# Patient Record
Sex: FEMALE | Race: WHITE | NOT HISPANIC OR LATINO | URBAN - NONMETROPOLITAN AREA
[De-identification: names, ages, dates, MRNs, and addresses within clinical notes are randomized per-mention and may not be internally consistent; named-entity substitution may affect disease eponyms.]

---

## 2018-11-29 PROBLEM — E11.9: Noted: 2019-01-30

## 2018-11-29 PROBLEM — H35.363: Noted: 2018-11-29

## 2018-11-29 PROBLEM — H25.813: Noted: 2019-01-30

## 2018-11-29 PROBLEM — H02.413: Noted: 2019-01-30

## 2019-01-30 ENCOUNTER — IMPORTED ENCOUNTER (OUTPATIENT)
Dept: URBAN - NONMETROPOLITAN AREA CLINIC 1 | Facility: CLINIC | Age: 84
End: 2019-01-30

## 2019-01-30 PROCEDURE — 92014 COMPRE OPH EXAM EST PT 1/>: CPT

## 2019-01-30 NOTE — PATIENT DISCUSSION
Cataract(s)-Visually significant cataract OU .-Cataract(s) causing symptomatic impairment of visual function not correctable with a tolerable change in glasses or contact lenses lighting or non-operative means resulting in specific activity limitations and/or participation restrictions including but not limited to reading viewing television driving or meeting vocational or recreational needs. -Expectation is clearer vision and functional improvement in symptoms as well as reduced glare disability after cataract removal.-Order IOLMaster and OPD today. -Recommend  Limbal Relaxing Incisions based on today's OPD testing and lifestyle questionnaire.-All questions were answered regarding surgery including pre and post-op medications appointments activity restrictions and anesthetic usage.-The risks benefits and alternatives and special risk factors for the patient were discussed in detail including but not limited to: bleeding infection retinal detachment vitreous loss problems with the implant and possible need for additional surgery.-Although rare the possibility of complete vision loss was discussed.-The possible need for glasses post-operatively was discussed.-Order H&P based on history of -Patient elects to proceed with cataract surgery OD . Will schedule at patient's convenience and re-evaluate OS  in the future. **Recommend patient start AT's/Durezol TID OU x 1 week then return for 1 week for repeat testing. DM s DR-Stressed the importance of keeping blood sugars under control blood pressure under control and weight normalization and regular visits with PCP. -Explained the possible effects of poorly controlled diabetes and the damage that diabetes can cause to ocular health. -Patient to check HgbA1C.-Pt instructed to contact our office with any vision changes. Ptosis/Bleph: *After CE OU* - MRD 1: 1mm OU; BLF 14 (-) lag TBUT 12 seconds -Ptosis (the upper eyelid being in a lower than normal position) of the upper eyelid was explained to the patient. -RBAs of ptosis repair discussed with patient.  Treatment options include observation or surgical correction.-Due to affect of dermatochalasis on aesthetic outcome recommend pt have blepharoplasty during ptosis repair.-Will order ptosis VF and external photos and review results with patient.; Dr's Notes: MR  5/23/18DFE  11/29/18Optos  5/14/15OCT mac  11/29/18

## 2019-02-25 ENCOUNTER — IMPORTED ENCOUNTER (OUTPATIENT)
Dept: URBAN - NONMETROPOLITAN AREA CLINIC 1 | Facility: CLINIC | Age: 84
End: 2019-02-25

## 2019-02-25 PROBLEM — E78.5: Noted: 2019-02-25

## 2019-02-25 PROBLEM — M51.36: Noted: 2019-02-25

## 2019-02-25 PROBLEM — Z85.3: Noted: 2019-02-25

## 2019-02-25 PROBLEM — M19.90: Noted: 2019-02-25

## 2019-02-25 PROBLEM — E11.9: Noted: 2019-01-30

## 2019-02-25 PROBLEM — I10: Noted: 2019-02-25

## 2019-02-25 PROBLEM — Z01.818: Noted: 2019-02-25

## 2019-02-25 PROCEDURE — 99214 OFFICE O/P EST MOD 30 MIN: CPT

## 2019-02-25 NOTE — PATIENT DISCUSSION
Per H&P patient has been cleared for cataract surgery; 's Notes: MR  5/23/18DFE  11/29/18Optos  5/14/15OCT mac  11/29/18

## 2019-03-05 ENCOUNTER — IMPORTED ENCOUNTER (OUTPATIENT)
Dept: URBAN - NONMETROPOLITAN AREA CLINIC 1 | Facility: CLINIC | Age: 84
End: 2019-03-05

## 2019-03-05 PROCEDURE — 92136 OPHTHALMIC BIOMETRY: CPT

## 2019-03-05 PROCEDURE — 66999 UNLISTED PX ANT SEGMENT EYE: CPT

## 2019-03-05 PROCEDURE — 66984 XCAPSL CTRC RMVL W/O ECP: CPT

## 2019-03-05 PROCEDURE — 99024 POSTOP FOLLOW-UP VISIT: CPT

## 2019-03-05 NOTE — PATIENT DISCUSSION
Cataract(s)-Visually significant cataract OS . -Cataract(s) causing symptomatic impairment of visual function not correctable with a tolerable change in glasses or contact lenses lighting or non-operative means resulting in specific activity limitations and/or participation restrictions including but not limited to reading viewing television driving or meeting vocational or recreational needs. -Expectation is clearer vision and functional improvement in symptoms as well as reduced glare disability after cataract removal.-Recommend Standard/Traditonal based on previous OPD testing and lifestyle questionnaire.-All questions were answered regarding surgery including pre and post-op medications appointments activity restrictions and anesthetic usage.-The risks benefits and alternatives and special risk factors for the patient were discussed in detail including but not limited to: bleeding infection retinal detachment vitreous loss problems with the implant and possible need for additional surgery.-Although rare the possibility of complete vision loss was discussed.-The need for glasses post-operatively was discussed.-Patient elects to proceed with cataract surgery OS . Will schedule at patient's convenience. s/p PCIOL Same Day POV OD Standard 3/5/19-Pt doing well at 1 week s/p PCIOL. -Continue post-op gtts according to instruction sheet.-Okay to resume usual activites and d/c eye shield. s/p PCIOL-Pt doing well s/p PCIOL. -Continue post-op gtts according to instruction sheet and sleep with eye shield over eye for 7 nights.-Avoid bending at the waist lifting anything over 5lbs and dirty or hedy environments. -RTC .; 's Notes: MR  5/23/18DFE  11/29/18Optos  5/14/15OCT mac  11/29/18

## 2019-03-07 PROBLEM — Z98.41: Noted: 2019-03-07

## 2019-03-07 PROBLEM — H25.812: Noted: 2019-03-07

## 2019-03-12 ENCOUNTER — IMPORTED ENCOUNTER (OUTPATIENT)
Dept: URBAN - NONMETROPOLITAN AREA CLINIC 1 | Facility: CLINIC | Age: 84
End: 2019-03-12

## 2019-03-12 PROBLEM — Z98.41: Noted: 2019-03-12

## 2019-03-12 PROBLEM — H25.812: Noted: 2019-03-12

## 2019-03-12 PROCEDURE — 99024 POSTOP FOLLOW-UP VISIT: CPT

## 2019-03-12 NOTE — PATIENT DISCUSSION
1 week POV CE OD 3/5/19 Standard- Discussed diagnosis in detail with patient - Patient is stable and doing well - Continue all post op drops as directed - Continue to monitor - RTC A/S with Dr. Mirela Goodman Cataract(s)-Visually significant cataract OS . -Cataract(s) causing symptomatic impairment of visual function not correctable with a tolerable change in glasses or contact lenses lighting or non-operative means resulting in specific activity limitations and/or participation restrictions including but not limited to reading viewing television driving or meeting vocational or recreational needs. -Expectation is clearer vision and functional improvement in symptoms as well as reduced glare disability after cataract removal.-Recommend Standard/Traditonal based on previous OPD testing and lifestyle questionnaire.-All questions were answered regarding surgery including pre and post-op medications appointments activity restrictions and anesthetic usage.-The risks benefits and alternatives and special risk factors for the patient were discussed in detail including but not limited to: bleeding infection retinal detachment vitreous loss problems with the implant and possible need for additional surgery.-Although rare the possibility of complete vision loss was discussed.-The need for glasses post-operatively was discussed.-Patient elects to proceed with cataract surgery OS .  Will schedule at patient's convenience.; 's Notes: MR  5/23/18DFE  11/29/18Optos  5/14/15OCT mac  11/29/18

## 2019-03-20 ENCOUNTER — IMPORTED ENCOUNTER (OUTPATIENT)
Dept: URBAN - NONMETROPOLITAN AREA CLINIC 1 | Facility: CLINIC | Age: 84
End: 2019-03-20

## 2019-03-20 PROBLEM — Z98.42: Noted: 2019-03-20

## 2019-03-20 PROBLEM — Z98.41: Noted: 2019-03-20

## 2019-03-20 PROCEDURE — 99024 POSTOP FOLLOW-UP VISIT: CPT

## 2019-03-20 NOTE — PATIENT DISCUSSION
1 day CE OS 3/19/19 CE OD 3/5/19 (Standard IOL w/ LRI OU)-  The pt has undergone successful cataract extraction with Intraocular lens implantation in both eyes now. -  PO examination is normal and visual acuity has improved. -  The pt has been instructed to call the office immediately if there is increased redness pain or vision loss. -  Instructed patient not to rub the eye and don’t go swimming. -  Pt should return in 1 week for follow up.  -  Ocular meds plan discussed and patient received printed take home instructions.; 's Notes: MR  5/23/18DFE  11/29/18Optos  5/14/15OCT mac  11/29/18

## 2019-03-26 ENCOUNTER — IMPORTED ENCOUNTER (OUTPATIENT)
Dept: URBAN - NONMETROPOLITAN AREA CLINIC 1 | Facility: CLINIC | Age: 84
End: 2019-03-26

## 2019-03-26 PROBLEM — Z98.42: Noted: 2019-03-20

## 2019-03-26 PROBLEM — Z98.41: Noted: 2019-03-20

## 2019-03-26 PROBLEM — H26.493: Noted: 2019-03-26

## 2019-03-26 PROCEDURE — 99024 POSTOP FOLLOW-UP VISIT: CPT

## 2019-03-26 NOTE — PATIENT DISCUSSION
1 Week POV CE OS 3/19/19 CE OD 3/5/19 Standard w/ LRI OU - Discussed diagnosis in detail with patient- Patient is stable and doing well- Wound intact- Continue all post op drops as directed- PCO OU noted but stable and no treatment needed at this time- Patient does not wish to have a glasses Rx and is okay with OTC readers  - Continue to monitor- RTC 3 months F/U Pseudophakia OU; 's Notes: MR  5/23/18DFE  11/29/18Optos  5/14/15OCT mac  11/29/18

## 2019-06-26 ENCOUNTER — IMPORTED ENCOUNTER (OUTPATIENT)
Dept: URBAN - NONMETROPOLITAN AREA CLINIC 1 | Facility: CLINIC | Age: 84
End: 2019-06-26

## 2019-06-26 PROBLEM — E11.9: Noted: 2020-02-05

## 2019-06-26 PROBLEM — H01.024: Noted: 2020-02-05

## 2019-06-26 PROBLEM — H26.493: Noted: 2020-02-05

## 2019-06-26 PROBLEM — H02.831: Noted: 2019-06-26

## 2019-06-26 PROBLEM — Z96.1: Noted: 2019-06-26

## 2019-06-26 PROBLEM — H01.021: Noted: 2020-02-05

## 2019-06-26 PROBLEM — Z96.1: Noted: 2020-02-05

## 2019-06-26 PROBLEM — H35.373: Noted: 2020-02-05

## 2019-06-26 PROBLEM — E11.9: Noted: 2019-06-26

## 2019-06-26 PROBLEM — H35.373: Noted: 2019-06-26

## 2019-06-26 PROBLEM — H26.493: Noted: 2019-06-26

## 2019-06-26 PROCEDURE — 99213 OFFICE O/P EST LOW 20 MIN: CPT

## 2019-06-26 NOTE — PATIENT DISCUSSION
"Pseudophakia OU with PCO OU- Discussed diagnosis in detail with patient- PCO OU noted but stable and no treatment needed at this time- Patient does not wish to have a glasses Rx and is okay with OTC readers  - Continue to monitor-------------------------------previous notes---------------------NIDDM-  Educated patient on condition-  Optos done previously no diabetic retinopathy seen today-  Continue good blood sugar control/diet-  Continue to monitorDRUSEN MACULA OU: - Discussed findings of exam in detail with the patient. - Discussed the chronic nature of this disease and limited treatment options. - Risk of developing macular degeneration discussed. - Ocular essential vitamin supplements were recommended. ""- Continue to monitor. Epiretinal membrane OU trace with mac scar OS: - Discussed findings of exam in detail with the patient. - Discussed the signs of worsening of the disease. .- Patient is stable at t is timr  - Continue to monitor for changes. Dermatochalasis OU- Educated patient on condition- No superior field of vision complaint at this time. - Continue to monitor. PVD OU: - Discussed findings of exam in detail with the patient. - The risk of retinal detachment in patients with PVDs was discussed with the patient and the warning signs of retinal detachment were carefully reviewed with the patient. - The patient was warned to return to the office or contact the ophthalmologist on call immediately if they experience signs of retinal detachment or changes in vision noted from today. - Continue to monitor.  NOTE:  Patient was followed as glaucoma suspect in distant past; however I do not feel that this diagnosis is acurate and will not be following her for this at this time. ""; 's Notes: MR  5/23/18DFE  11/29/18Optos  5/14/15OCT mac  11/29/18"

## 2020-02-05 ENCOUNTER — IMPORTED ENCOUNTER (OUTPATIENT)
Dept: URBAN - NONMETROPOLITAN AREA CLINIC 1 | Facility: CLINIC | Age: 85
End: 2020-02-05

## 2020-02-05 PROCEDURE — 92014 COMPRE OPH EXAM EST PT 1/>: CPT

## 2020-02-05 PROCEDURE — 92134 CPTRZ OPH DX IMG PST SGM RTA: CPT

## 2020-02-05 NOTE — PATIENT DISCUSSION
"Pseudophakia OU with PCO OU- Discussed diagnosis in detail with patient- PCO OU noted but stable and no treatment needed at this time- Patient does not wish to have a glasses Rx and is okay with OTC readers  - Continue to monitorNIDDM-  Educated patient on condition-  Optos done previously no diabetic retinopathy seen today-  Continue good blood sugar control/diet-  Continue to monitorDRUSEN MACULA OU: - Discussed findings of exam in detail with the patient.- OCT done today shows Drusen OU but not ARMD at this time - [address] [facility] given today with instructions on how to use 2/5/20- Continue to monitor. Epiretinal membrane OU trace with mac scar OS: - Discussed findings of exam in detail with the patient. - Discussed the signs of worsening of the disease. .- OCT done today shows ERM OU but stable from previous   - Continue to monitor Dermatochalasis OU- Educated patient on condition- No superior field of vision complaint at this time. - Continue to monitor. PVD OU: - Discussed findings of exam in detail with the patient. - The risk of retinal detachment in patients with PVDs was discussed with the patient and the warning signs of retinal detachment were carefully reviewed with the patient. - The patient was warned to return to the office or contact the ophthalmologist on call immediately if they experience signs of retinal detachment or changes in vision noted from today. - Continue to monitor.  Blepharitis OU- Discussed diagnosis in detail with patient- Recommend patient using J & J baby shampoo to scrub lid daily- Continue to monitorNOTE:  Patient was followed as glaucoma suspect in distant past; however I do not feel that this diagnosis is acurate and will not be following her for this at this time. ""; 's Notes: MR  5/23/18DFE  11/29/18Optos  5/14/15OCT mac  11/29/18"

## 2020-08-05 ENCOUNTER — IMPORTED ENCOUNTER (OUTPATIENT)
Dept: URBAN - NONMETROPOLITAN AREA CLINIC 1 | Facility: CLINIC | Age: 85
End: 2020-08-05

## 2020-08-05 PROCEDURE — 92250 FUNDUS PHOTOGRAPHY W/I&R: CPT

## 2020-08-05 PROCEDURE — 92014 COMPRE OPH EXAM EST PT 1/>: CPT

## 2020-08-05 NOTE — PATIENT DISCUSSION
"Pseudophakia OU with PCO OU- Discussed diagnosis in detail with patient- PCO OU noted but stable and no treatment needed at this time- Patient does not wish to have a glasses Rx and is okay with OTC readers  - Continue to monitorNIDDM-  Educated patient on condition-  Stressed good blood sugar control-  Recommend no sodas -  Optos done today no diabetic retinopathy seen today-  Continue to monitorDRUSEN MACULA OU: - Discussed findings of exam in detail with the patient.- OCT done previously shows Drusen OU but not ARMD at this time - [address] [facility] given today with instructions on how to use 2/5/20- Continue to monitor. Epiretinal membrane OU trace with mac scar OS: - Discussed findings of exam in detail with the patient. - Discussed the signs of worsening of the disease. .- OCT done previously shows ERM OU but stable from previous   - Optos done today shows ERM OU but stable - Continue to monitor Dermatochalasis OU- Educated patient on condition- No superior field of vision complaint at this time. - Continue to monitor. PVD OD: - Discussed findings of exam in detail with the patient. - The risk of retinal detachment in patients with PVDs was discussed with the patient and the warning signs of retinal detachment were carefully reviewed with the patient. - The patient was warned to return to the office or contact the ophthalmologist on call immediately if they experience signs of retinal detachment or changes in vision noted from today. - Continue to monitor.  Blepharitis OU- Discussed diagnosis in detail with patient- Recommend patient using J & J baby shampoo to scrub lid daily- Continue to monitorNOTE:  Patient was followed as glaucoma suspect in distant past; however I do not feel that this diagnosis is acurate and will not be following her for this at this time. ""; Dr's Notes: MR  5/23/18DFE  8/5/20Optos  8/5/20OCT mac  11/29/18"

## 2021-02-03 ENCOUNTER — IMPORTED ENCOUNTER (OUTPATIENT)
Dept: URBAN - NONMETROPOLITAN AREA CLINIC 1 | Facility: CLINIC | Age: 86
End: 2021-02-03

## 2021-02-03 PROBLEM — E11.9: Noted: 2021-02-03

## 2021-02-03 PROBLEM — H02.831: Noted: 2021-02-03

## 2021-02-03 PROBLEM — Z96.1: Noted: 2021-02-03

## 2021-02-03 PROBLEM — H26.493: Noted: 2021-02-03

## 2021-02-03 PROBLEM — H01.021: Noted: 2021-02-03

## 2021-02-03 PROBLEM — H35.3131: Noted: 2021-02-03

## 2021-02-03 PROBLEM — H35.373: Noted: 2021-02-03

## 2021-02-03 PROCEDURE — 92134 CPTRZ OPH DX IMG PST SGM RTA: CPT

## 2021-02-03 PROCEDURE — 92014 COMPRE OPH EXAM EST PT 1/>: CPT

## 2021-02-03 NOTE — PATIENT DISCUSSION
"Pseudophakia OU with PCO OU- Discussed diagnosis in detail with patient- PCO OU noted but stable and no treatment needed at this time- Patient does not wish to have a glasses Rx and is okay with OTC readers  - Continue to monitorNIDDM-  Educated patient on condition-  Stressed good blood sugar control-  Recommend no sodas -  Optos done today no diabetic retinopathy seen today-  Continue to monitorARMD / ERM OU trace with mac scar OS: - Discussed findings of exam in detail with the patient.- REcommend Eye vitamins such as Preservision BID sample given today- Continue following the [address] [facility] call or come into the office ASAP if any changes noted from today 2/3/21- OCT done today shows ERM OU and Drusen OU but stable from previous   - Optos done previously shows ERM OU but stable - Continue to monitor Dermatochalasis OU- Educated patient on condition- No superior field of vision complaint at this time. - Continue to monitor. PVD OD: - Discussed findings of exam in detail with the patient. - The risk of retinal detachment in patients with PVDs was discussed with the patient and the warning signs of retinal detachment were carefully reviewed with the patient. - The patient was warned to return to the office or contact the ophthalmologist on call immediately if they experience signs of retinal detachment or changes in vision noted from today. - Continue to monitor.  Blepharitis OU- Discussed diagnosis in detail with patient- Recommend patient using J & J baby shampoo to scrub lid daily- Continue to monitorNOTE:  Patient was followed as glaucoma suspect in distant past; however I do not feel that this diagnosis is acurate and will not be following her for this at this time. ""; 's Notes: MR  5/23/18DFE  8/5/20Optos  2/3/20OCT mac  11/29/18"

## 2021-08-04 ENCOUNTER — IMPORTED ENCOUNTER (OUTPATIENT)
Dept: URBAN - NONMETROPOLITAN AREA CLINIC 1 | Facility: CLINIC | Age: 86
End: 2021-08-04

## 2021-08-04 PROCEDURE — 92250 FUNDUS PHOTOGRAPHY W/I&R: CPT

## 2021-08-04 PROCEDURE — 99214 OFFICE O/P EST MOD 30 MIN: CPT

## 2021-08-04 PROCEDURE — 92015 DETERMINE REFRACTIVE STATE: CPT

## 2021-08-04 NOTE — PATIENT DISCUSSION
"Pseudophakia OU with PCO OU- Discussed diagnosis in detail with patient- PCO OU noted but stable and no treatment needed at this time- Continue to monitorNIDDM with mild NPDR OD -  Educated patient on condition-  Stressed good blood sugar control-  Recommend no sodas -  Optos done today OD shows dot blots temporal to mac -  Continue to monitorARMD / ERM OU trace with mac scar OS: - Discussed findings of exam in detail with the patient.- Recommend Eye vitamins such as Preservision BID sample given today- Continue following the [address] [facility] [address] call or come into the office ASAP if any changes noted from today 8/4/21- OCT done previously shows ERM OU and Drusen OU but stable from previous   - Optos done today shows ERM and drusen OU but stable - Continue to monitor Dermatochalasis OU- Educated patient on condition- No superior field of vision complaint at this time. - Continue to monitor. PVD OD: - Discussed findings of exam in detail with the patient. - The risk of retinal detachment in patients with PVDs was discussed with the patient and the warning signs of retinal detachment were carefully reviewed with the patient. - The patient was warned to return to the office or contact the ophthalmologist on call immediately if they experience signs of retinal detachment or changes in vision noted from today. - Continue to monitor.  Blepharitis OU- Discussed diagnosis in detail with patient- Recommend patient using J & J baby shampoo to scrub lid daily- Continue to monitorNOTE:  Patient was followed as glaucoma suspect in distant past; however I do not feel that this diagnosis is acurate and will not be following her for this at this time. ""; 's Notes: MR  5/23/18DFE  8/5/20Optos  8/4/21OCT mac  11/29/18"

## 2022-02-06 ENCOUNTER — PREPPED CHART (OUTPATIENT)
Dept: URBAN - NONMETROPOLITAN AREA CLINIC 1 | Facility: CLINIC | Age: 87
End: 2022-02-06

## 2022-02-06 ASSESSMENT — TONOMETRY
OD_IOP_MMHG: 14
OS_IOP_MMHG: 14

## 2022-02-06 ASSESSMENT — VISUAL ACUITY
OS_SC: 20/25-1
OD_SC: 20/30

## 2022-02-06 NOTE — PATIENT DISCUSSION
Importance of daily AREDS II study multivitamin and Amsler Grid checks discussed with patient. Patient to follow-up immediately with any new onset of decreased vision and/or metamorphopsia.

## 2022-02-07 ENCOUNTER — FOLLOW UP (OUTPATIENT)
Dept: URBAN - NONMETROPOLITAN AREA CLINIC 1 | Facility: CLINIC | Age: 87
End: 2022-02-07

## 2022-02-07 DIAGNOSIS — H35.3131: ICD-10-CM

## 2022-02-07 DIAGNOSIS — H35.373: ICD-10-CM

## 2022-02-07 PROCEDURE — 92134 CPTRZ OPH DX IMG PST SGM RTA: CPT

## 2022-02-07 PROCEDURE — 99214 OFFICE O/P EST MOD 30 MIN: CPT

## 2022-02-07 ASSESSMENT — TONOMETRY
OS_IOP_MMHG: 14
OD_IOP_MMHG: 14

## 2022-02-07 ASSESSMENT — VISUAL ACUITY
OS_SC: 20/25-2 SLOW
OD_SC: 20/30-2

## 2022-02-07 NOTE — PATIENT DISCUSSION
Discussed diagnosis in detail with patient. Signs/symptoms associated with progression discussed. Recommend  patient continue eye vitamins daily such as PreserVision. Recommend that patient follow the [address], patient to call or come into the office ASAP if any changes noted from today. Continue to monitor.

## 2022-02-07 NOTE — PATIENT DISCUSSION
Discussed diagnosis in detail with patient. *No diabetic retinopathy noted on exam today. Stressed importance of good blood sugar control and how it can affect ocular health/vision. Recommend no soda’s.  Letter will be sent to PCP today as well. Continue to monitor closely for changes.

## 2022-04-15 ASSESSMENT — VISUAL ACUITY
OS_CC: 20/25-
OS_CC: 20/25-
OS_AM: 20/20-
OS_PH: 20/30-
OS_CC: 20/40 GLASSES
OD_CC: 20/29+
OD_CC: 20/25+2
OD_CC: 20/25-1
OS_CC: 20/200
OD_CC: 20/30-
OS_CC: 20/25-2
OS_CC: 20/25-2
OU_CC: 20/25
OD_CC: 20/20-1
OD_SC: 20/40+
OD_CC: 20/20
OD_PH: 20/25
OS_CC: J1
OD_CC: 20/25-
OS_GLARE: 20/100
OD_PAM: 20/20-
OD_CC: 20/30-
OS_AM: 20/20-
OS_SC: 20/30-
OD_CC: 20/20-1
OS_GLARE: 20/100
OS_CC: 20/29+
OS_GLARE: 20/100
OS_CC: 20/20-2

## 2022-04-15 ASSESSMENT — TONOMETRY
OS_IOP_MMHG: 24
OD_IOP_MMHG: 14
OD_IOP_MMHG: 19
OS_IOP_MMHG: 16
OD_IOP_MMHG: 17
OD_IOP_MMHG: 14
OD_IOP_MMHG: 17
OS_IOP_MMHG: 16
OS_IOP_MMHG: 15
OS_IOP_MMHG: 14
OD_IOP_MMHG: 13
OS_IOP_MMHG: 16
OD_IOP_MMHG: 14
OS_IOP_MMHG: 16
OS_IOP_MMHG: 14
OS_IOP_MMHG: 14
OS_IOP_MMHG: 16
OD_IOP_MMHG: 12

## 2022-08-08 ENCOUNTER — FOLLOW UP (OUTPATIENT)
Dept: URBAN - NONMETROPOLITAN AREA CLINIC 1 | Facility: CLINIC | Age: 87
End: 2022-08-08

## 2022-08-08 DIAGNOSIS — H35.3131: ICD-10-CM

## 2022-08-08 DIAGNOSIS — H35.373: ICD-10-CM

## 2022-08-08 PROCEDURE — 99214 OFFICE O/P EST MOD 30 MIN: CPT

## 2022-08-08 PROCEDURE — 92250 FUNDUS PHOTOGRAPHY W/I&R: CPT

## 2022-08-08 ASSESSMENT — TONOMETRY
OS_IOP_MMHG: 12
OD_IOP_MMHG: 12

## 2022-08-08 ASSESSMENT — VISUAL ACUITY
OD_SC: 20/30
OS_SC: 20/40
OS_PH: 20/30

## 2022-08-08 NOTE — PATIENT DISCUSSION
Discussed diagnosis in detail with patient. Signs/symptoms associated with progression discussed. Recommend  patient continue eye vitamins daily such as PreserVision. Recommend that patient follow the [address], patient to call or come into the office ASAP if any changes noted from today. Continue to monitor.

## 2022-08-08 NOTE — PATIENT DISCUSSION
Discussed findings in detail with patient. Recommended patient to continue using Refresh. Continue to monitor. Small lesion noted on end of nose. Recommended consult with Dermatology.

## 2022-08-08 NOTE — PATIENT DISCUSSION
Discussed diagnosis in detail with patient. *No diabetic retinopathy noted on exam today. Stressed importance of good blood sugar control and how it can affect ocular health/vision. Recommend no soda’s.  Letter will be sent to PCP Dr. Michael Perez today. Continue to monitor closely for changes.

## 2022-11-01 NOTE — PATIENT DISCUSSION
Patient saw Dr. Morales Farooq about this in 2016 and she said it might migrate more centrally.  It is not interfering with his pupil but he says it's significant to him and he wants to see if she'll remove it.

## 2022-11-01 NOTE — PATIENT DISCUSSION
*** After initially scheduling with Dr. Chiki Mata per the patient's request we were informed that she no longer removes pterygia and we would need to refer to Dr. Marc Cardenas earliest convenience with Dr. Ralph Corley ***.

## 2023-05-17 ENCOUNTER — CLINIC PROCEDURE ONLY (OUTPATIENT)
Dept: URBAN - NONMETROPOLITAN AREA CLINIC 1 | Facility: CLINIC | Age: 88
End: 2023-05-17

## 2023-05-17 DIAGNOSIS — H26.491: ICD-10-CM

## 2023-05-17 PROCEDURE — 66821 AFTER CATARACT LASER SURGERY: CPT

## 2023-05-17 ASSESSMENT — VISUAL ACUITY
OS_SC: 20/30+1
OD_BAT: 20/40
OD_SC: 20/25-1

## 2023-05-17 ASSESSMENT — TONOMETRY
OS_IOP_MMHG: 14
OD_IOP_MMHG: 15

## 2023-05-31 ENCOUNTER — POST-OP (OUTPATIENT)
Dept: URBAN - NONMETROPOLITAN AREA CLINIC 1 | Facility: CLINIC | Age: 88
End: 2023-05-31

## 2023-05-31 DIAGNOSIS — Z98.890: ICD-10-CM

## 2023-05-31 PROCEDURE — 99024 POSTOP FOLLOW-UP VISIT: CPT

## 2023-05-31 ASSESSMENT — VISUAL ACUITY
OD_CC: 20/25 W/ +2.50 READERS
OU_CC: 20/25 W/ +2.50 READERS
OS_CC: 20/25 W/ +2.50 READERS
OU_SC: 20/30-1
OS_SC: 20/30-1
OD_SC: 20/30-1

## 2023-05-31 ASSESSMENT — TONOMETRY
OS_IOP_MMHG: 15
OD_IOP_MMHG: 15

## 2023-09-14 ENCOUNTER — FOLLOW UP (OUTPATIENT)
Dept: URBAN - NONMETROPOLITAN AREA CLINIC 1 | Facility: CLINIC | Age: 88
End: 2023-09-14

## 2023-09-14 DIAGNOSIS — H35.373: ICD-10-CM

## 2023-09-14 DIAGNOSIS — H35.3131: ICD-10-CM

## 2023-09-14 DIAGNOSIS — E11.9: ICD-10-CM

## 2023-09-14 PROCEDURE — 92250 FUNDUS PHOTOGRAPHY W/I&R: CPT

## 2023-09-14 PROCEDURE — 99214 OFFICE O/P EST MOD 30 MIN: CPT

## 2023-09-14 ASSESSMENT — VISUAL ACUITY
OS_SC: 20/29-1
OD_SC: 20/22-1

## 2023-09-14 ASSESSMENT — TONOMETRY
OS_IOP_MMHG: 15
OD_IOP_MMHG: 15

## 2024-03-15 ENCOUNTER — ESTABLISHED PATIENT (OUTPATIENT)
Dept: URBAN - NONMETROPOLITAN AREA CLINIC 1 | Facility: CLINIC | Age: 89
End: 2024-03-15

## 2024-03-15 DIAGNOSIS — H52.4: ICD-10-CM

## 2024-03-15 PROCEDURE — 92014 COMPRE OPH EXAM EST PT 1/>: CPT

## 2024-03-15 PROCEDURE — 92015 DETERMINE REFRACTIVE STATE: CPT

## 2024-03-15 ASSESSMENT — VISUAL ACUITY
OD_SC: 20/40-1
OS_SC: 20/40
OS_PH: 20/25
OU_SC: 20/30-1

## 2024-03-15 ASSESSMENT — TONOMETRY
OD_IOP_MMHG: 14
OS_IOP_MMHG: 14

## 2024-06-17 ENCOUNTER — EMERGENCY VISIT (OUTPATIENT)
Dept: URBAN - NONMETROPOLITAN AREA CLINIC 1 | Facility: CLINIC | Age: 89
End: 2024-06-17

## 2024-06-17 DIAGNOSIS — H16.143: ICD-10-CM

## 2024-06-17 PROCEDURE — 99213 OFFICE O/P EST LOW 20 MIN: CPT

## 2024-06-17 RX ORDER — TOBRAMYCIN AND DEXAMETHASONE 1; 3 MG/ML; MG/ML: 1 SUSPENSION/ DROPS OPHTHALMIC

## 2024-06-17 ASSESSMENT — TONOMETRY
OD_IOP_MMHG: 13
OS_IOP_MMHG: 13

## 2024-06-17 ASSESSMENT — VISUAL ACUITY
OD_SC: 20/29-2
OS_SC: 20/63-2
OU_SC: 20/29

## 2024-09-16 ENCOUNTER — FOLLOW UP (OUTPATIENT)
Dept: URBAN - NONMETROPOLITAN AREA CLINIC 1 | Facility: CLINIC | Age: 89
End: 2024-09-16

## 2024-09-16 DIAGNOSIS — E11.9: ICD-10-CM

## 2024-09-16 DIAGNOSIS — H35.3132: ICD-10-CM

## 2024-09-16 DIAGNOSIS — H35.373: ICD-10-CM

## 2024-09-16 PROCEDURE — 99213 OFFICE O/P EST LOW 20 MIN: CPT

## 2024-09-16 PROCEDURE — 92134 CPTRZ OPH DX IMG PST SGM RTA: CPT

## 2025-03-18 ENCOUNTER — COMPREHENSIVE EXAM (OUTPATIENT)
Age: OVER 89
End: 2025-03-18

## 2025-03-18 DIAGNOSIS — H52.4: ICD-10-CM

## 2025-03-18 PROCEDURE — 92014 COMPRE OPH EXAM EST PT 1/>: CPT

## 2025-03-18 PROCEDURE — 92015 DETERMINE REFRACTIVE STATE: CPT
